# Patient Record
Sex: FEMALE | Race: BLACK OR AFRICAN AMERICAN | URBAN - METROPOLITAN AREA
[De-identification: names, ages, dates, MRNs, and addresses within clinical notes are randomized per-mention and may not be internally consistent; named-entity substitution may affect disease eponyms.]

---

## 2021-05-17 ENCOUNTER — OFFICE VISIT (OUTPATIENT)
Dept: URBAN - METROPOLITAN AREA CLINIC 27 | Facility: CLINIC | Age: 61
End: 2021-05-17
Payer: COMMERCIAL

## 2021-05-17 DIAGNOSIS — H25.13 AGE-RELATED NUCLEAR CATARACT, BILATERAL: ICD-10-CM

## 2021-05-17 PROCEDURE — 92134 CPTRZ OPH DX IMG PST SGM RTA: CPT | Performed by: OPHTHALMOLOGY

## 2021-05-17 PROCEDURE — 92004 COMPRE OPH EXAM NEW PT 1/>: CPT | Performed by: OPHTHALMOLOGY

## 2021-05-17 ASSESSMENT — INTRAOCULAR PRESSURE
OS: 17
OD: 21

## 2021-05-17 NOTE — IMPRESSION/PLAN
Impression: Type 2 diabetes mellitus with proliferative diabetic retinopathy with macular edema, bilateral: C09.9506. Plan: Pt c/o LOV OS x4 months. Exam/OCT show PDR with CSDME OU. Discussed Dx and NHx with patient. Reviewed options of obs, Avastin, and laser. Recommend Avastin OU today. Will follow monthly and add PRP vs PPV/PRP when appropriate. 

4 weeks, photos/FA sweeps/OCT OU, reeval DME OU

## 2021-06-14 ENCOUNTER — OFFICE VISIT (OUTPATIENT)
Dept: URBAN - METROPOLITAN AREA CLINIC 27 | Facility: CLINIC | Age: 61
End: 2021-06-14
Payer: COMMERCIAL

## 2021-06-14 DIAGNOSIS — E11.3513 TYPE 2 DIABETES MELLITUS WITH PROLIFERATIVE DIABETIC RETINOPATHY WITH MACULAR EDEMA, BILATERAL: Primary | ICD-10-CM

## 2021-06-14 PROCEDURE — 92012 INTRM OPH EXAM EST PATIENT: CPT | Performed by: OPHTHALMOLOGY

## 2021-06-14 PROCEDURE — 92134 CPTRZ OPH DX IMG PST SGM RTA: CPT | Performed by: OPHTHALMOLOGY

## 2021-06-14 PROCEDURE — 92235 FLUORESCEIN ANGRPH MLTIFRAME: CPT | Performed by: OPHTHALMOLOGY

## 2021-06-14 ASSESSMENT — INTRAOCULAR PRESSURE
OS: 42
OD: 26

## 2021-06-14 NOTE — IMPRESSION/PLAN
Impression: PDR w DME OU Plan: Exam/photos/OCT show PDR with CSDME OU. FA sweeps 5/17/21 shows macular leakage OU, NVE OU. Reviewed options of obs, Avastin, and laser. Recommend Avastin OU today. Will follow monthly and add PRP vs PPV/PRP when appropriate. 

4 weeks, DFE/OCT OU, reeval DME OU

## 2021-07-12 ENCOUNTER — OFFICE VISIT (OUTPATIENT)
Dept: URBAN - METROPOLITAN AREA CLINIC 27 | Facility: CLINIC | Age: 61
End: 2021-07-12
Payer: COMMERCIAL

## 2021-07-12 DIAGNOSIS — H43.12 VITREOUS HEMORRHAGE, LEFT EYE: ICD-10-CM

## 2021-07-12 PROCEDURE — 92012 INTRM OPH EXAM EST PATIENT: CPT | Performed by: OPHTHALMOLOGY

## 2021-07-12 PROCEDURE — 92134 CPTRZ OPH DX IMG PST SGM RTA: CPT | Performed by: OPHTHALMOLOGY

## 2021-07-12 ASSESSMENT — INTRAOCULAR PRESSURE
OS: 22
OD: 22

## 2021-07-12 NOTE — IMPRESSION/PLAN
Impression: PDR w DME OU Plan: Exam/OCT show PDR with pesistent diffuse CSDME s/p Avastin OU. FA sweeps 5/17/21 showed macular leakage OU, NVE OU. Reviewed options of obs, Avastin, and laser. Recommend Avastin OU today. Given persistence, will submit Ozurdex BI. Will follow monthly and add PRP vs PPV/PRP when appropriate. 

4 weeks, DFE/OCT OU, reeval DME OU (oz)